# Patient Record
Sex: FEMALE | Race: WHITE | URBAN - METROPOLITAN AREA
[De-identification: names, ages, dates, MRNs, and addresses within clinical notes are randomized per-mention and may not be internally consistent; named-entity substitution may affect disease eponyms.]

---

## 2021-09-21 ENCOUNTER — MEDICAL CORRESPONDENCE (OUTPATIENT)
Dept: HEALTH INFORMATION MANAGEMENT | Facility: CLINIC | Age: 31
End: 2021-09-21

## 2021-09-29 ENCOUNTER — TRANSCRIBE ORDERS (OUTPATIENT)
Dept: OTHER | Age: 31
End: 2021-09-29

## 2021-09-29 DIAGNOSIS — M79.672 LEFT FOOT PAIN: ICD-10-CM

## 2021-09-29 DIAGNOSIS — M79.671 RIGHT FOOT PAIN: ICD-10-CM

## 2021-09-29 DIAGNOSIS — Q82.8 PALMOPLANTAR KERATODERMA: Primary | ICD-10-CM

## 2021-09-30 ENCOUNTER — TELEPHONE (OUTPATIENT)
Dept: CONSULT | Facility: CLINIC | Age: 31
End: 2021-09-30

## 2021-09-30 NOTE — TELEPHONE ENCOUNTER
Referral received for patient to be seen in Genetics. LVM for patient informing her that we can schedule her for Genetics appointment, however it is recommended that Genetics appointment be scheduled AFTER Dermatology appointment, and advised patient that we would want to see Derm records. Call center number given for scheduling. When patient calls back, OK to assist in scheduling new pt Genetics visit with any available Genetics MD (excluding Dr. Ornelas) with GC visit 30 minutes prior. In person visit OK. Please obtain e-mail address so that intake form can be sent. Thank you.

## 2021-10-04 NOTE — TELEPHONE ENCOUNTER
Health Call Center    Phone Message    May a detailed message be left on voicemail: yes     Reason for Call:     Patient is schedule for derm appt with Ben Montes in [place], [place] on 10/12.   Patient was given fax number to have derm forward medical records.  Appt schedule with Dr. Barraza virtual visit 10/27  Email: [email or web address]

## 2021-10-12 ENCOUNTER — TRANSFERRED RECORDS (OUTPATIENT)
Dept: HEALTH INFORMATION MANAGEMENT | Facility: CLINIC | Age: 31
End: 2021-10-12
Payer: COMMERCIAL

## 2021-10-27 ENCOUNTER — VIRTUAL VISIT (OUTPATIENT)
Dept: CONSULT | Facility: CLINIC | Age: 31
End: 2021-10-27
Payer: COMMERCIAL

## 2021-10-27 DIAGNOSIS — Q82.8 PALMOPLANTAR KERATODERMA: ICD-10-CM

## 2021-10-27 DIAGNOSIS — Z71.83 ENCOUNTER FOR NONPROCREATIVE GENETIC COUNSELING: Primary | ICD-10-CM

## 2021-10-27 PROCEDURE — 96040 HC GENETIC COUNSELING, EACH 30 MINUTES: CPT | Mod: GT,95 | Performed by: GENETIC COUNSELOR, MS

## 2021-10-27 PROCEDURE — 999N000103 HC STATISTIC NO CHARGE FACILITY FEE: Mod: GT,95

## 2021-10-27 NOTE — PROGRESS NOTES
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 I met with Karey to obtain a family history, discuss possible genetic contributions to her symptoms, and to obtain informed consent for genetic testing.       Personal History:   Karey was referred for keratoderma on the left proximal palmar middle finger, left radial palm, right proximal palmar middle finger, right radial palm, right plantar forefoot overlying 4th metatarsal, right plantar forefoot overlying 1st metatarsal, left plantar forefoot overlying 3rd metatarsal, left medial plantar heel, and right lateral plantar heal.     Neuro: some seizures following alcohol withdrawal.   Psyche: diagnosed with schizoaffective disorder with bipolar disorder. Also has a history of depression and anxiety.   Optho: negative  ENT: history of chronic ear infections as a child until 20s, current constant ringing. Some pain in ears and occasional dizziness. Diagnosed with Meniere's disease previously, but that diagnosis was removed after a recent normal hearing evaluation.   Dental: hypercalcification.  Endo: negative  Cardio: negative  Respiratory: frequent bronchitis from reflux.  GI: GERD, stomach doesn't digest food well.  : negative  MSK: negative  Osteo: reports multiple fractures in hands and arms due to trauma to the area.  Derm: has been seeing specialists since she was a child for painful calluses on hands and feet. These were recently diagnosed as palmoplantar keratoderma. She also gets frequent bacterial infections on skin.  Heme: history of anemia, bruises easily.     Previous Genetic Testing:   Currently undergoing pharmacogenetic testing. Has not yet received results.         Family History:   A limited pedigree was obtained today and scanned into the EMR. Karey was adopted through a closed adoption as an infant. She recently got in contact with her maternal-half sister through [address] [place] [address]. Karey provided the following information today:     Siblings:    Maternal half-sister (37) with a history of similar ear concerns   Maternal Relatives:    Mother passed away at age 56 from throat and tongue cancer. She had a history of tobacco, drug, and alcohol use.     Aunt with schizophrenia.     Multiple aunts and cousins with palmoplantar keratoderma (PPK).   Paternal Relatives:    Karey does not have information about her biological father or other paternal relatives.      Discussion and Assessment:  We reviewed that genes are long stretches of DNA that are responsible for how our bodies look and how our bodies work.  Our genes are inherited on structures called chromosomes of which we have 23 pairs.  One copy of each chromosome in a pair is inherited from the mother and one is inherited from the father.  Changes in the DNA sequence of a gene, called mutations, as well as changes within the chromosomes can cause the signs and symptoms of a genetic condition. It is possible that Colemans symptoms are due to a underlying genetic cause.     Palmoplantar keratoderma (PPK) is a heterogeneous group of disorders characterized by thickening of the palms and the soles. The various forms of PPK can be divided into hereditary forms with only skin problems (isolated PPK), hereditary syndromes with PPK and associated features such as lesions of skin, hair, teeth, nails, or sweat glands and/or with abnormalities of other organs, and acquired forms. There are a handful of genes known to be associated with the hereditary forms. Depending on the gene involved, inheritance of PPK can be autosomal dominant or autosomal recessive.    We are recommending genetic testing for Karey in the form of a palmoplantar  keratoderma panel. This panel will analyze 25 genes associated with hereditary PPK. The potential results were discussed including a positive, negative, or variant of uncertain significance.       One possibility is a change(s) could be seen in Amanda and this change(s) is known to cause similar symptoms to the symptoms Amanda has experienced.  This is considered a positive result.  A positive result may provide more information on appropriate clinical management for Amanda and may provide information on additional potential health risks associated with Amanda's diagnosis.  A positive result can also have implications for the health and reproductive risks of other relatives.    It is also possible that no change(s) that are likely to explain Amanda's symptoms are found.  This is considered a negative result.  A negative result would not completely rule out a possible genetic cause for Amanda's symptoms.    Not all changes in our genes cause disease.  Sometimes, it can be difficult for the laboratory to determine whether or not a change that is found contributes to the patient's symptoms.  If the meaning of a particular gene change is unknown, the lab classifies the result as a variant of unknown significance (VUS). Follow-up testing of relatives may be beneficial in clarifying the meaning of this result.    It is medically necessary to determine if there is an underlying genetic cause for Karey's symptoms for several reasons. First and foremost this can be important for her own health.  It is possible that an underlying cause may also predispose Karey to other health risks.  Knowing about these additional health risks can help us stay ahead of Karey's healthcare to more appropriately screen for other complications.  Some diagnoses may also have treatment options. Additionally, discovering an underlying reason may help predict the chance for other family members to have similar healthcare needs.  Finally,  having a specific underlying diagnosis can sometimes help individuals receive the services they need to help reach their full potential in school, in work, or in day to day life.     Karey provided informed consent for the testing, signed with verbal consent (COVID-19). I will submit a prior authorization on Karey behalf. A determination about this will be made in about 2-4 weeks. At this time, Karey will be contacted with the determination and will be able to decide if she would like to proceed with the test. If so, a blood draw will be scheduled. Testing will then be initiated and will take approximately 4-6 weeks.     Lab results may be automatically released via [facility].  Department protocol is to hold genetic testing results until we have reviewed them. We will then contact the family directly to disclose the results and ensure they receive a copy of the report.     I will call Karey when we have results and we will schedule a follow-up visit if needed at that time. She is encouraged to reach out to me with any questions in the meantime.       Plan  1. A prior authorization will be submitted for Karey's palmoplantar keratoderma panel at [facility]. Karey will be notified of the determination and will schedule a blood draw.     2. After testing is initiated, results will be returned by phone in 4-6 weeks and we will schedule a follow-up appointment as needed.    3. Contact information was provided should any questions arise in the future.        Nia Britton MS, [facility]  Genetic Counselor  [facility]   Phone: [phone]     This visit was co-counseled by Genetic Counseling Student, Dionicio Koroma.     Approximate Time Spent in Consultation: 36 min     CC: no letter

## 2021-10-27 NOTE — PROGRESS NOTES
Karey is a 31 year old who is being evaluated via a billable video visit.      How would you like to obtain your AVS? Mail a copy  If the video visit is dropped, the invitation should be resent by: Send to e-mail at: [email or web address]   Will anyone else be joining your video visit? Myranda Gale LPN

## 2021-10-31 ENCOUNTER — LAB (OUTPATIENT)
Dept: LAB | Facility: CLINIC | Age: 31
End: 2021-10-31
Payer: COMMERCIAL

## 2021-10-31 DIAGNOSIS — Z71.83 ENCOUNTER FOR NONPROCREATIVE GENETIC COUNSELING: ICD-10-CM

## 2021-10-31 DIAGNOSIS — Q82.8 PALMOPLANTAR KERATODERMA: ICD-10-CM

## 2021-10-31 LAB
LAB DIRECTOR INTERPRETATION: NORMAL
SIGNIFICANT RESULTS: NORMAL
SPECIMEN DESCRIPTION: NORMAL
TEST DETAILS, MDL: NORMAL

## 2021-10-31 PROCEDURE — 36415 COLL VENOUS BLD VENIPUNCTURE: CPT

## 2021-11-02 LAB — INTERPRETATION: NORMAL

## 2021-11-29 ENCOUNTER — TELEPHONE (OUTPATIENT)
Dept: LAB | Facility: CLINIC | Age: 31
End: 2021-11-29
Payer: COMMERCIAL

## 2021-11-29 NOTE — PROGRESS NOTES
Notified Karey that we received prior authorization approval for her genetic testing. Valid from 10/31/2021 to 12/31/2021.      Explained that insurance benefits may still apply, therefore, there could be an out of pocket cost.    Karey expressed understanding and stated that she wants to proceed with testing. We will call when results are available. She had no further questions.    CECI Reyna  Genomics Billing    [facility]   [facility]  [address] [place] [id number]  [phone]

## 2021-12-08 ENCOUNTER — LAB (OUTPATIENT)
Dept: LAB | Facility: CLINIC | Age: 31
End: 2021-12-08
Payer: COMMERCIAL

## 2021-12-08 DIAGNOSIS — Z71.83 ENCOUNTER FOR NONPROCREATIVE GENETIC COUNSELING: Primary | ICD-10-CM

## 2021-12-08 DIAGNOSIS — Q82.8 PALMOPLANTAR KERATODERMA: ICD-10-CM

## 2021-12-14 ENCOUNTER — TELEPHONE (OUTPATIENT)
Dept: CONSULT | Facility: CLINIC | Age: 31
End: 2021-12-14

## 2021-12-14 PROCEDURE — G0452 MOLECULAR PATHOLOGY INTERPR: HCPCS | Mod: 26 | Performed by: PATHOLOGY

## 2021-12-14 NOTE — TELEPHONE ENCOUNTER
I called Karey to discuss the results her genetic testing.    A palmoplantar keratoderma panel was completed at [facility] the [facility]. These results were uncertain. 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    The KRT16 gene provides instructions for making a protein called keratin 16 or K16. Keratins are a group of tough, fibrous proteins that form the structural framework of certain cells, particularly cells that make up the skin, hair, and nails. Keratin 16 is produced in the nails, the skin on the palms of the hands and soles of the feet, and the oral mucosa that lines the inside of the mouth. Disease-causing variants in KRT16 is associated with pachyonychia congenita and focal nonepidermolytic palmoplantar keratoderma. It is possible that this variant is contributing to Karey's features.     Variant of uncertain significance (VUS) means that a change was identified in the KRT16 gene, but the lab does not have enough information about this particular change to know if it could actually cause palmoplantar keratoderma or if it is just part of normal variation between people. In the future, we may gain additional information about this gene and variant that may help us better understand whether or not this change is contributing to Karey's features. At this time, there are no available family members that could provide samples to help clarify the pathogenicity of the variant.     No other disease-causing or uncertain changes were identified in the 25 genes analyzed. This result rules out many potential genetic causes for Karey's features. However, it is still possible that her features are due to a genetic factor not analyzed by this particular test.     Karey should continue to follow up with her referring provider as needed. She is encouraged to reach out to us every few years to see if the KRT16 variant has been  reclassified or if new concerns or questions arise. I will send a copy of the report to Karey for her own records.      Nia Britton MS, [facility]  Genetic Counselor  [facility]   Phone: [phone]